# Patient Record
Sex: MALE | Race: BLACK OR AFRICAN AMERICAN
[De-identification: names, ages, dates, MRNs, and addresses within clinical notes are randomized per-mention and may not be internally consistent; named-entity substitution may affect disease eponyms.]

---

## 2018-03-16 ENCOUNTER — HOSPITAL ENCOUNTER (OUTPATIENT)
Dept: HOSPITAL 56 - MW.SDS | Age: 58
Discharge: HOME | End: 2018-03-16
Attending: SURGERY
Payer: COMMERCIAL
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
## 2018-03-16 DIAGNOSIS — T34.531A: Primary | ICD-10-CM

## 2018-03-16 PROCEDURE — 26951 AMPUTATION OF FINGER/THUMB: CPT

## 2018-03-16 NOTE — [id number]
Post Anesthesia Note





- EVALUATION WITHIN 48HRS OF ANESTHETIC


Vital Signs in Normal Range: Yes


Patient Participated in Evaluation: Yes


Respiratory Function Stable: Yes


Airway Patent: Yes


Cardiovascular Function Stable: Yes


Hydration Status Stable: Yes


Pain Control Satisfactory: Yes


Nausea and Vomiting Control Satisfactory: Yes


Mental Status Recovered: Yes


Resp Rate: 13

## 2018-03-16 NOTE — PCM.PREANE
Preanesthetic Assessment





- Anesthesia/Transfusion/Family Hx


Anesthesia History: Prior Anesthesia Without Reaction


Family History of Anesthesia Reaction: No


Transfusion History: No Prior Transfusion(s)





- Review of Systems


General: No Symptoms


Pulmonary: No Symptoms


Cardiovascular: No Symptoms


Gastrointestinal: No Symptoms


Neurological: No Symptoms


Other: Reports: None





- Physical Assessment


NPO Status Date: 03/15/18


NPO Status Time: 21:00


O2 Sat by Pulse Oximetry: 97


Respiratory Rate: 16


Vital Signs: 





 Last Vital Signs











Temp  36.4 C   03/16/18 08:45


 


Pulse  73   03/16/18 08:45


 


Resp  16   03/16/18 08:45


 


BP  131/80   03/16/18 08:45


 


Pulse Ox  97   03/16/18 08:45











Height: 1.63 m


Weight: 55.338 kg


ASA Class: 2


Mental Status: Alert & Oriented x3


Dentition: Reports: Normal Dentition





- Allergies


Allergies/Adverse Reactions: 


 Allergies











Allergy/AdvReac Type Severity Reaction Status Date / Time


 


No Known Allergies Allergy   Verified 03/14/18 09:03














- Anesthesia Plan


Pre-Op Medication Ordered: None





- Acknowledgements


Anesthesia Type Planned: General Anesthesia


Pt an Appropriate Candidate for the Planned Anesthesia: Yes


Alternatives and Risks of Anesthesia Discussed w Pt/Guardian: Yes


Pt/Guardian Understands and Agrees with Anesthesia Plan: Yes





PreAnesthesia Questionnaire


HEENT History: Reports: Other (See Below)


Other HEENT History: has upper and lower dentures


Musculoskeletal History: Reports: Fracture


Other Musculoskeletal History: left arm





- Past Surgical History


Head Surgeries/Procedures: Reports: None


Musculoskeletal Surgical History: Reports: ORIF


Other Musculoskeletal Surgeries/Procedures:: left arm with tendon repair (has 

hardware)





- SUBSTANCE USE


Smoking Status *Q: Never Smoker


Recreational Drug Use History: No





- HOME MEDS


Home Medications: 


 Home Meds





. [No Known Home Meds]  03/14/18 [History]











- CURRENT (IN HOUSE) MEDS


Current Meds: 





 Current Medications





Hydrocodone Bitart/Acetaminophen (Norco 325-5 Mg)  1 tab PO Q4H PRN


   PRN Reason: Pain


Lactated Ringer's (Ringers, Lactated)  1,000 mls @ 125 mls/hr IV ASDIRECTED LUIS A


   Last Admin: 03/16/18 08:57 Dose:  125 mls/hr





Discontinued Medications





Bupivacaine HCl/Epinephrine Bitart (Marcaine 0.25%/Epinephrine 1:200,000)  10 

ml INJECT ONETIME ONE


   Stop: 03/16/18 08:01


Fentanyl (Sublimaze) Confirm Administered Dose 100 mcg .ROUTE .STK-MED ONE


   Stop: 03/16/18 07:18


Cefazolin Sodium/Dextrose 2 gm (/ Premix)  50 mls @ 100 mls/hr IV ONETIME ONE


   Stop: 03/16/18 08:29


Bupivacaine HCl/Epinephrine Bitart (Sensorc Mpf 0.25%-Epi 1:[id number]) Confirm 

Administered Dose 30 mls @ as directed .ROUTE .STK-MED ONE


   Stop: 03/16/18 07:35


Lidocaine (Xylocaine-Mpf 2%) Confirm Administered Dose 5 ml .ROUTE .STK-MED ONE


   Stop: 03/16/18 07:18


Midazolam HCl (Versed 1 Mg/Ml) Confirm Administered Dose 2 mg .ROUTE .STK-MED 

ONE


   Stop: 03/16/18 07:18


Ondansetron HCl (Zofran) Confirm Administered Dose 4 mg .ROUTE .STK-MED ONE


   Stop: 03/16/18 07:18


Propofol (Diprivan  20 Ml) Confirm Administered Dose 200 mg .ROUTE .STK-MED ONE


   Stop: 03/16/18 07:18

## 2018-03-19 NOTE — PCM.OPNOTE
- General Post-Op/Procedure Note


Date of Surgery/Procedure: 03/16/18


Operative Procedure(s): right middle finger and ring finger amputations s/p 

frostbite


Pre Op Diagnosis: frostbite with tissue necrosis of the middle and ring 

fingertips right


Post-Op Diagnosis: Same


Primary Surgeon: Mary Austin


Assistant: Wendy Gill


Complications: None


Condition: Good

## 2018-03-20 NOTE — OR
SURGEON:

MARCIO BARBER MD

 

DATE OF PROCEDURE:  03/16/2018

 

PREOPERATIVE DIAGNOSIS:

Frostbite with tissue necrosis of the middle and ring fingertips, right.

 

POSTOPERATIVE DIAGNOSIS:

Frostbite with tissue necrosis of the middle and ring fingertips, right.

 

PROCEDURE:

Right middle finger and ring finger amputation status post frostbite.

 

ASSISTANT:

JANIE Peraza.

 

REASON ASSISTANT WAS NECESSARY:

Prepping, draping, and closure assistance.

 

INDICATION:

The patient is seen today in re-evaluation after conservative management of his

frostbite with tissue necrosis of the middle and ring fingertips.  He has gone

on to do well with regeneration as much as possible and the tips have mummified.

We discussed risks and benefits of amputation at this time, and he was in

agreement to proceed.  Risks were including, but not limited to, bleeding,

infection, damage to underlying or overlying structures, possible need for

future interventions, and possible scarring.

 

PROCEDURE IN DETAIL:

After informed consent was obtained and placed on the chart, the patient was

brought to the operating theater and laid in the supine position.  After

adequate general anesthesia was obtained, the area was prepped and draped and a

time-out was completed to confirm side and site.

 

Once adequately confirmed, the area had been prepped and draped with Betadine

cleansing solution and the arm was then exsanguinated and the tourniquet was

insufflated to 200 mmHg.  The mummified fingertip was excised with close margins

to allow viable tissue.  Hemostasis was obtained and the underlying bone was

rongeured back to the tendinous insertions attempting to wean these in place.

Once adequate necrotic tissue was removed, the skin flaps were then

reapproximated over the distal end of the proximal and distal phalanx.  These

were sutured in place using 4-0 and 5-0 chromic stitches after copious

irrigation.  Once adequately closed, the wounds were dressed with Xeroform,

fluffs, and a Kerlix gauze dressing.  The patient tolerated this well on both

the middle finger and ring finger.  All counts and needles were correct at the

end of the case.



FOLLOW UP:  The patient will see us in 10-14 days, sooner with any issues or 
concerns.  Script given for pain control. 

 

 

MARII / MODL

DD:  03/19/2018 17:05:58

DT:  03/20/2018 00:17:31

Job #:  [id number]

MTDD